# Patient Record
Sex: FEMALE | Race: WHITE | NOT HISPANIC OR LATINO | Employment: UNEMPLOYED | URBAN - METROPOLITAN AREA
[De-identification: names, ages, dates, MRNs, and addresses within clinical notes are randomized per-mention and may not be internally consistent; named-entity substitution may affect disease eponyms.]

---

## 2021-04-17 ENCOUNTER — HOSPITAL ENCOUNTER (EMERGENCY)
Facility: HOSPITAL | Age: 36
Discharge: HOME | End: 2021-04-17
Attending: EMERGENCY MEDICINE
Payer: MEDICAID

## 2021-04-17 VITALS
RESPIRATION RATE: 16 BRPM | DIASTOLIC BLOOD PRESSURE: 59 MMHG | BODY MASS INDEX: 24.55 KG/M2 | TEMPERATURE: 99 F | HEART RATE: 52 BPM | OXYGEN SATURATION: 99 % | HEIGHT: 61 IN | WEIGHT: 130.06 LBS | SYSTOLIC BLOOD PRESSURE: 112 MMHG

## 2021-04-17 DIAGNOSIS — M54.50 ACUTE BILATERAL LOW BACK PAIN WITHOUT SCIATICA: ICD-10-CM

## 2021-04-17 DIAGNOSIS — R30.0 DYSURIA: ICD-10-CM

## 2021-04-17 LAB
ANION GAP SERPL CALC-SCNC: 9 MMOL/L (ref 8–16)
B-HCG UR QL: NEGATIVE
BASOPHILS # BLD AUTO: 0.02 K/UL (ref 0–0.2)
BASOPHILS NFR BLD: 0.3 % (ref 0–1.9)
BILIRUB UR QL STRIP: NEGATIVE
CALCIUM SERPL-MCNC: 9.5 MG/DL (ref 8.7–10.5)
CHLORIDE SERPL-SCNC: 103 MMOL/L (ref 95–110)
CLARITY UR REFRACT.AUTO: CLEAR
CO2 SERPL-SCNC: 28 MMOL/L (ref 23–29)
COLOR UR AUTO: YELLOW
CREAT SERPL-MCNC: 0.72 MG/DL (ref 0.5–1.4)
DIFFERENTIAL METHOD: ABNORMAL
EOSINOPHIL # BLD AUTO: 0.2 K/UL (ref 0–0.5)
EOSINOPHIL NFR BLD: 2.2 % (ref 0–8)
ERYTHROCYTE [DISTWIDTH] IN BLOOD BY AUTOMATED COUNT: 12 % (ref 11.5–14.5)
EST. GFR  (AFRICAN AMERICAN): >60 ML/MIN/1.73 M^2
EST. GFR  (NON AFRICAN AMERICAN): >60 ML/MIN/1.73 M^2
GLUCOSE SERPL-MCNC: 88 MG/DL (ref 70–110)
GLUCOSE UR QL STRIP: NEGATIVE
HCT VFR BLD AUTO: 39.1 % (ref 37–48.5)
HGB BLD-MCNC: 13.4 G/DL (ref 12–16)
HGB UR QL STRIP: NEGATIVE
IMM GRANULOCYTES # BLD AUTO: 0.01 K/UL (ref 0–0.04)
IMM GRANULOCYTES NFR BLD AUTO: 0.1 % (ref 0–0.5)
KETONES UR QL STRIP: NEGATIVE
LEUKOCYTE ESTERASE UR QL STRIP: NEGATIVE
LYMPHOCYTES # BLD AUTO: 2.8 K/UL (ref 1–4.8)
LYMPHOCYTES NFR BLD: 40.6 % (ref 18–48)
MCH RBC QN AUTO: 31.3 PG (ref 27–31)
MCHC RBC AUTO-ENTMCNC: 34.3 G/DL (ref 32–36)
MCV RBC AUTO: 91 FL (ref 82–98)
MONOCYTES # BLD AUTO: 0.6 K/UL (ref 0.3–1)
MONOCYTES NFR BLD: 9.2 % (ref 4–15)
NEUTROPHILS # BLD AUTO: 3.3 K/UL (ref 1.8–7.7)
NEUTROPHILS NFR BLD: 47.6 % (ref 38–73)
NITRITE UR QL STRIP: NEGATIVE
NRBC BLD-RTO: 0 /100 WBC
PH UR STRIP: 5 [PH] (ref 5–8)
PLATELET # BLD AUTO: 314 K/UL (ref 150–450)
PMV BLD AUTO: 9 FL (ref 9.2–12.9)
POTASSIUM SERPL-SCNC: 4 MMOL/L (ref 3.5–5.1)
PROT UR QL STRIP: NEGATIVE
RBC # BLD AUTO: 4.28 M/UL (ref 4–5.4)
SODIUM SERPL-SCNC: 140 MMOL/L (ref 136–145)
SP GR UR STRIP: 1.02 (ref 1–1.03)
URN SPEC COLLECT METH UR: NORMAL
UROBILINOGEN UR STRIP-ACNC: NEGATIVE EU/DL
UUN UR-MCNC: 11 MG/DL (ref 7–17)
WBC # BLD AUTO: 6.88 K/UL (ref 3.9–12.7)

## 2021-04-17 PROCEDURE — 63600175 PHARM REV CODE 636 W HCPCS: Mod: ER | Performed by: EMERGENCY MEDICINE

## 2021-04-17 PROCEDURE — 81003 URINALYSIS AUTO W/O SCOPE: CPT | Mod: ER | Performed by: EMERGENCY MEDICINE

## 2021-04-17 PROCEDURE — 81025 URINE PREGNANCY TEST: CPT | Mod: ER | Performed by: EMERGENCY MEDICINE

## 2021-04-17 PROCEDURE — 96374 THER/PROPH/DIAG INJ IV PUSH: CPT | Mod: ER

## 2021-04-17 PROCEDURE — 85025 COMPLETE CBC W/AUTO DIFF WBC: CPT | Mod: ER | Performed by: EMERGENCY MEDICINE

## 2021-04-17 PROCEDURE — 99284 EMERGENCY DEPT VISIT MOD MDM: CPT | Mod: 25,ER

## 2021-04-17 PROCEDURE — 80048 BASIC METABOLIC PNL TOTAL CA: CPT | Mod: ER | Performed by: EMERGENCY MEDICINE

## 2021-04-17 RX ORDER — KETOROLAC TROMETHAMINE 10 MG/1
10 TABLET, FILM COATED ORAL EVERY 8 HOURS PRN
Qty: 12 TABLET | Refills: 0 | Status: SHIPPED | OUTPATIENT
Start: 2021-04-17 | End: 2021-08-09 | Stop reason: CLARIF

## 2021-04-17 RX ORDER — METHOCARBAMOL 500 MG/1
1000 TABLET, FILM COATED ORAL 4 TIMES DAILY PRN
Qty: 30 TABLET | Refills: 0 | Status: SHIPPED | OUTPATIENT
Start: 2021-04-17 | End: 2021-04-22

## 2021-04-17 RX ORDER — ONDANSETRON 4 MG/1
4 TABLET, ORALLY DISINTEGRATING ORAL EVERY 6 HOURS PRN
Qty: 10 TABLET | Refills: 0 | Status: SHIPPED | OUTPATIENT
Start: 2021-04-17 | End: 2021-08-09 | Stop reason: CLARIF

## 2021-04-17 RX ORDER — CLONAZEPAM 1 MG/1
1 TABLET ORAL 2 TIMES DAILY PRN
COMMUNITY

## 2021-04-17 RX ORDER — KETOROLAC TROMETHAMINE 30 MG/ML
15 INJECTION, SOLUTION INTRAMUSCULAR; INTRAVENOUS
Status: COMPLETED | OUTPATIENT
Start: 2021-04-17 | End: 2021-04-17

## 2021-04-17 RX ORDER — BUPRENORPHINE HYDROCHLORIDE AND NALOXONE HYDROCHLORIDE DIHYDRATE 2; .5 MG/1; MG/1
TABLET SUBLINGUAL EVERY 6 HOURS PRN
COMMUNITY

## 2021-04-17 RX ADMIN — KETOROLAC TROMETHAMINE 15 MG: 30 INJECTION, SOLUTION INTRAMUSCULAR at 08:04

## 2021-06-13 ENCOUNTER — HOSPITAL ENCOUNTER (EMERGENCY)
Facility: HOSPITAL | Age: 36
Discharge: HOME | End: 2021-06-13
Attending: EMERGENCY MEDICINE
Payer: MEDICAID

## 2021-06-13 VITALS
RESPIRATION RATE: 19 BRPM | HEART RATE: 52 BPM | TEMPERATURE: 99 F | DIASTOLIC BLOOD PRESSURE: 50 MMHG | SYSTOLIC BLOOD PRESSURE: 92 MMHG | HEIGHT: 61 IN | BODY MASS INDEX: 24.55 KG/M2 | OXYGEN SATURATION: 98 % | WEIGHT: 130 LBS

## 2021-06-13 DIAGNOSIS — N39.0 URINARY TRACT INFECTION WITHOUT HEMATURIA, SITE UNSPECIFIED: Primary | ICD-10-CM

## 2021-06-13 LAB
ALBUMIN SERPL BCP-MCNC: 4.1 G/DL (ref 3.5–5.2)
ALP SERPL-CCNC: 54 U/L (ref 38–126)
ALT SERPL W/O P-5'-P-CCNC: 12 U/L (ref 10–44)
ANION GAP SERPL CALC-SCNC: 5 MMOL/L (ref 8–16)
AST SERPL-CCNC: 19 U/L (ref 15–46)
B-HCG UR QL: NEGATIVE
BACTERIA #/AREA URNS AUTO: ABNORMAL /HPF
BASOPHILS # BLD AUTO: 0.03 K/UL (ref 0–0.2)
BASOPHILS NFR BLD: 0.4 % (ref 0–1.9)
BILIRUB SERPL-MCNC: 0.6 MG/DL (ref 0.1–1)
BILIRUB UR QL STRIP: NEGATIVE
CALCIUM SERPL-MCNC: 9.2 MG/DL (ref 8.7–10.5)
CHLORIDE SERPL-SCNC: 105 MMOL/L (ref 95–110)
CLARITY UR REFRACT.AUTO: CLEAR
CO2 SERPL-SCNC: 27 MMOL/L (ref 23–29)
COLOR UR AUTO: YELLOW
CREAT SERPL-MCNC: 0.62 MG/DL (ref 0.5–1.4)
DIFFERENTIAL METHOD: ABNORMAL
EOSINOPHIL # BLD AUTO: 0.1 K/UL (ref 0–0.5)
EOSINOPHIL NFR BLD: 1.5 % (ref 0–8)
ERYTHROCYTE [DISTWIDTH] IN BLOOD BY AUTOMATED COUNT: 12.4 % (ref 11.5–14.5)
EST. GFR  (AFRICAN AMERICAN): >60 ML/MIN/1.73 M^2
EST. GFR  (NON AFRICAN AMERICAN): >60 ML/MIN/1.73 M^2
GLUCOSE SERPL-MCNC: 91 MG/DL (ref 70–110)
GLUCOSE UR QL STRIP: NEGATIVE
HCT VFR BLD AUTO: 38.1 % (ref 37–48.5)
HGB BLD-MCNC: 12.8 G/DL (ref 12–16)
HGB UR QL STRIP: NEGATIVE
IMM GRANULOCYTES # BLD AUTO: 0.02 K/UL (ref 0–0.04)
IMM GRANULOCYTES NFR BLD AUTO: 0.3 % (ref 0–0.5)
KETONES UR QL STRIP: NEGATIVE
LEUKOCYTE ESTERASE UR QL STRIP: ABNORMAL
LYMPHOCYTES # BLD AUTO: 2.5 K/UL (ref 1–4.8)
LYMPHOCYTES NFR BLD: 32.8 % (ref 18–48)
MCH RBC QN AUTO: 31 PG (ref 27–31)
MCHC RBC AUTO-ENTMCNC: 33.6 G/DL (ref 32–36)
MCV RBC AUTO: 92 FL (ref 82–98)
MICROSCOPIC COMMENT: ABNORMAL
MONOCYTES # BLD AUTO: 0.7 K/UL (ref 0.3–1)
MONOCYTES NFR BLD: 8.8 % (ref 4–15)
NEUTROPHILS # BLD AUTO: 4.2 K/UL (ref 1.8–7.7)
NEUTROPHILS NFR BLD: 56.2 % (ref 38–73)
NITRITE UR QL STRIP: POSITIVE
NRBC BLD-RTO: 0 /100 WBC
PH UR STRIP: 5 [PH] (ref 5–8)
PLATELET # BLD AUTO: 283 K/UL (ref 150–450)
PMV BLD AUTO: 8.9 FL (ref 9.2–12.9)
POTASSIUM SERPL-SCNC: 4.2 MMOL/L (ref 3.5–5.1)
PROT SERPL-MCNC: 6.7 G/DL (ref 6–8.4)
PROT UR QL STRIP: NEGATIVE
RBC # BLD AUTO: 4.13 M/UL (ref 4–5.4)
RBC #/AREA URNS AUTO: 2 /HPF (ref 0–4)
SODIUM SERPL-SCNC: 137 MMOL/L (ref 136–145)
SP GR UR STRIP: 1.02 (ref 1–1.03)
URN SPEC COLLECT METH UR: ABNORMAL
UROBILINOGEN UR STRIP-ACNC: NEGATIVE EU/DL
UUN UR-MCNC: 11 MG/DL (ref 7–17)
WBC # BLD AUTO: 7.5 K/UL (ref 3.9–12.7)
WBC #/AREA URNS AUTO: 7 /HPF (ref 0–5)

## 2021-06-13 PROCEDURE — 81025 URINE PREGNANCY TEST: CPT | Mod: ER | Performed by: PHYSICIAN ASSISTANT

## 2021-06-13 PROCEDURE — 80053 COMPREHEN METABOLIC PANEL: CPT | Mod: ER | Performed by: EMERGENCY MEDICINE

## 2021-06-13 PROCEDURE — 99284 EMERGENCY DEPT VISIT MOD MDM: CPT | Mod: ER

## 2021-06-13 PROCEDURE — 85025 COMPLETE CBC W/AUTO DIFF WBC: CPT | Mod: ER | Performed by: EMERGENCY MEDICINE

## 2021-06-13 PROCEDURE — 81000 URINALYSIS NONAUTO W/SCOPE: CPT | Mod: ER | Performed by: PHYSICIAN ASSISTANT

## 2021-06-13 RX ORDER — PHENAZOPYRIDINE HYDROCHLORIDE 200 MG/1
200 TABLET, FILM COATED ORAL 2 TIMES DAILY
Qty: 4 TABLET | Refills: 0 | Status: SHIPPED | OUTPATIENT
Start: 2021-06-13 | End: 2021-06-15

## 2021-06-13 RX ORDER — CIPROFLOXACIN 500 MG/1
500 TABLET ORAL 2 TIMES DAILY
Qty: 20 TABLET | Refills: 0 | Status: SHIPPED | OUTPATIENT
Start: 2021-06-13 | End: 2021-06-18

## 2021-06-13 RX ORDER — ORPHENADRINE CITRATE 100 MG/1
100 TABLET, EXTENDED RELEASE ORAL 2 TIMES DAILY PRN
Qty: 20 TABLET | Refills: 0 | Status: SHIPPED | OUTPATIENT
Start: 2021-06-13 | End: 2021-08-09 | Stop reason: CLARIF

## 2021-07-18 ENCOUNTER — HOSPITAL ENCOUNTER (EMERGENCY)
Facility: HOSPITAL | Age: 36
Discharge: HOME | End: 2021-07-18
Attending: FAMILY MEDICINE
Payer: MEDICAID

## 2021-07-18 VITALS
BODY MASS INDEX: 20.39 KG/M2 | OXYGEN SATURATION: 98 % | SYSTOLIC BLOOD PRESSURE: 138 MMHG | HEART RATE: 63 BPM | TEMPERATURE: 99 F | RESPIRATION RATE: 19 BRPM | HEIGHT: 61 IN | WEIGHT: 108 LBS | DIASTOLIC BLOOD PRESSURE: 74 MMHG

## 2021-07-18 DIAGNOSIS — J06.9 VIRAL URI WITH COUGH: Primary | ICD-10-CM

## 2021-07-18 LAB
INFLUENZA A, MOLECULAR: NEGATIVE
INFLUENZA B, MOLECULAR: NEGATIVE
SARS-COV-2 RDRP RESP QL NAA+PROBE: NEGATIVE
SPECIMEN SOURCE: NORMAL

## 2021-07-18 PROCEDURE — U0002 COVID-19 LAB TEST NON-CDC: HCPCS | Mod: ER | Performed by: FAMILY MEDICINE

## 2021-07-18 PROCEDURE — 87502 INFLUENZA DNA AMP PROBE: CPT | Mod: ER | Performed by: FAMILY MEDICINE

## 2021-07-18 PROCEDURE — 99283 EMERGENCY DEPT VISIT LOW MDM: CPT | Mod: 25,ER

## 2021-07-18 RX ORDER — BENZONATATE 100 MG/1
100 CAPSULE ORAL 3 TIMES DAILY PRN
Qty: 20 CAPSULE | Refills: 0 | Status: SHIPPED | OUTPATIENT
Start: 2021-07-18 | End: 2021-07-28

## 2021-07-21 ENCOUNTER — TELEPHONE (OUTPATIENT)
Dept: ADMINISTRATIVE | Facility: OTHER | Age: 36
End: 2021-07-21

## 2021-08-09 ENCOUNTER — HOSPITAL ENCOUNTER (EMERGENCY)
Facility: HOSPITAL | Age: 36
Discharge: HOME | End: 2021-08-09
Attending: EMERGENCY MEDICINE
Payer: MEDICAID

## 2021-08-09 VITALS
TEMPERATURE: 99 F | RESPIRATION RATE: 15 BRPM | BODY MASS INDEX: 23.62 KG/M2 | OXYGEN SATURATION: 98 % | DIASTOLIC BLOOD PRESSURE: 79 MMHG | SYSTOLIC BLOOD PRESSURE: 120 MMHG | WEIGHT: 125 LBS | HEART RATE: 84 BPM

## 2021-08-09 DIAGNOSIS — S60.221A CONTUSION OF RIGHT HAND, INITIAL ENCOUNTER: Primary | ICD-10-CM

## 2021-08-09 LAB — B-HCG UR QL: NEGATIVE

## 2021-08-09 PROCEDURE — 25000003 PHARM REV CODE 250: Mod: ER | Performed by: PHYSICIAN ASSISTANT

## 2021-08-09 PROCEDURE — 81025 URINE PREGNANCY TEST: CPT | Mod: ER | Performed by: PHYSICIAN ASSISTANT

## 2021-08-09 PROCEDURE — 99284 EMERGENCY DEPT VISIT MOD MDM: CPT | Mod: ER

## 2021-08-09 RX ORDER — ACETAMINOPHEN 325 MG/1
650 TABLET ORAL
Status: COMPLETED | OUTPATIENT
Start: 2021-08-09 | End: 2021-08-09

## 2021-08-09 RX ADMIN — ACETAMINOPHEN 650 MG: 325 TABLET ORAL at 06:08

## 2023-07-12 ENCOUNTER — HOSPITAL ENCOUNTER (EMERGENCY)
Facility: HOSPITAL | Age: 38
Discharge: HOME | End: 2023-07-13
Attending: EMERGENCY MEDICINE
Payer: MEDICAID

## 2023-07-12 VITALS
SYSTOLIC BLOOD PRESSURE: 153 MMHG | RESPIRATION RATE: 19 BRPM | TEMPERATURE: 99 F | DIASTOLIC BLOOD PRESSURE: 100 MMHG | BODY MASS INDEX: 22.66 KG/M2 | HEIGHT: 61 IN | OXYGEN SATURATION: 97 % | WEIGHT: 120 LBS | HEART RATE: 87 BPM

## 2023-07-12 DIAGNOSIS — R20.2 PARESTHESIAS: Primary | ICD-10-CM

## 2023-07-12 PROCEDURE — 99282 EMERGENCY DEPT VISIT SF MDM: CPT | Mod: ER

## 2023-07-12 RX ORDER — NITROFURANTOIN 25; 75 MG/1; MG/1
100 CAPSULE ORAL
COMMUNITY

## 2023-07-12 RX ORDER — KETOROLAC TROMETHAMINE 15 MG/ML
30 INJECTION, SOLUTION INTRAMUSCULAR; INTRAVENOUS EVERY 6 HOURS
COMMUNITY

## 2023-07-13 NOTE — ED TRIAGE NOTES
Pt presents to ED c c/o tingling to left leg. Pt reports starting two new medications (Macrobid and Toradol) Pt states tingling sensation began after eating [employer] and wants to make sure she doesn't have food poisoning. Denies any nausea, vomiting, or diarrhea.

## 2023-07-14 NOTE — ED PROVIDER NOTES
ED Provider Note - 2023    History     Chief Complaint   Patient presents with    Tingling     C/o tingling to left hand and leg started about an hour pta. Started abx yesterday. Stiffness in neck. No dizziness or blurry vision. No slurred speech. Equal hand grasps no pronator drift.      Patient currently presents with concern regarding left forearm tingling and tenderness.  Patient notes associated stiffness in the left side of her neck.  Patient does endorse recent activity with pressure washing her home.  Patient has not had associated weakness nor difficulty with coordination.  She does report that this evening she has a small area of tingling to the medial aspect of her distal thigh but this appears to be localized to that region alone.  Patient remains easily ambulatory with steady gait.  Patient has not had associated headache nor visual changes nor does she endorse difficulty speaking.    Review of patient's allergies indicates:   Allergen Reactions    Penicillins      Past Medical History:   Diagnosis Date    Bipolar 1 disorder     Thyroid disease      Past Surgical History:   Procedure Laterality Date     SECTION      DILATION AND CURETTAGE OF UTERUS       No family history on file.  Social History     Tobacco Use    Smoking status: Some Days     Types: Cigarettes, Vaping with nicotine   Substance Use Topics    Alcohol use: Never    Drug use: Yes     Types: Marijuana     Comment: last use today     Review of Systems   Constitutional:  Negative for chills and fever.   HENT:  Negative for congestion and rhinorrhea.    Respiratory:  Negative for cough and shortness of breath.    Cardiovascular:  Negative for chest pain and palpitations.   Gastrointestinal:  Negative for abdominal pain, diarrhea and vomiting.   Genitourinary:  Negative for difficulty urinating and dysuria.   Skin:  Negative for color change and rash.   Neurological:  Negative for dizziness and light-headedness.   Hematological:   Negative for adenopathy. Does not bruise/bleed easily.     Physical Exam     Initial Vitals [07/12/23 2251]   BP Pulse Resp Temp SpO2   (!) 153/100 87 19 98.5 °F (36.9 °C) 97 %      MAP       --           Physical Exam    Nursing note and vitals reviewed.  Constitutional: She appears well-developed and well-nourished. She is not diaphoretic. No distress.   HENT:   Head: Normocephalic and atraumatic.   Nose: Nose normal.   Mouth/Throat: Oropharynx is clear and moist.   Eyes: Conjunctivae and EOM are normal. Pupils are equal, round, and reactive to light. No scleral icterus.   Neck: Neck supple. No JVD present.   Cardiovascular:  Normal rate, regular rhythm, normal heart sounds and intact distal pulses.           Pulmonary/Chest: Breath sounds normal. No respiratory distress.   Abdominal: Abdomen is soft. There is no abdominal tenderness.   Musculoskeletal:         General: No edema. Normal range of motion.      Cervical back: Neck supple.     Neurological: She is alert and oriented to person, place, and time. She has normal strength. No cranial nerve deficit or sensory deficit. Coordination and gait normal. GCS score is 15. GCS eye subscore is 4. GCS verbal subscore is 5. GCS motor subscore is 6.   Skin: Skin is warm and dry.   Psychiatric: Her behavior is normal. Thought content normal. Her mood appears anxious. She is attentive.       ED Course   Procedures                   MDM  Differential Diagnoses         LABS   Labs Reviewed - No data to display      Imaging     Imaging Results    None            EKG        ED Management/Discussion   Medications - No data to display                The patient's list of active medical problems, social history, medications, and allergies as documented per RN staff has been reviewed.                 Left upper extremity symptoms are not present during the encounter.  Left lower extremity paresthesia appears to be well confined to a small region on the left medial thigh.  Patient  has no additional neurological symptoms at this time.  I suspect that her upper extremity symptoms including the tightness in her neck may have been from recent activities as reported above.  The lower extremity symptoms remains somewhat vague though I see no signs of any vascular compromise or infection.    On final assessment, the patient appears well and comfortable for discharge.  I see no indication of an emergent process beyond that addressed during our encounter but have duly counseled the patient/family regarding the need for prompt follow-up as well as the indications that should prompt immediate return to the emergency room should new or worrisome developments occur.  The patient/family has been provided with verbal and printed direction regarding our final diagnosis(es) as well as instructions regarding use of OTC and/or Rx medications intended to manage the patient's aforementioned conditions including:  ED Prescriptions    None           Patient has been advised of the following recommended follow-up instructions:  Follow-up Information       Follow up With Specialties Details Why Contact Info    PCP  Schedule an appointment as soon as possible for a visit  for reassessment     [facility] Emergency Dept Emergency Medicine Go to  As needed, If symptoms worsen [address] [facility] [address] [phone]          The patient/family communicates understanding of all this information and all remaining questions and concerns were addressed at this time.      Referrals:  No orders of the defined types were placed in this encounter.      CLINICAL IMPRESSION    ICD-10-CM ICD-9-CM   1. Paresthesias  R20.2 782.0          ED Disposition Condition    Discharge Stable               Hai Goetz MD  07/13/23 3145

## 2024-05-31 ENCOUNTER — HOSPITAL ENCOUNTER (EMERGENCY)
Facility: HOSPITAL | Age: 39
Discharge: HOME | End: 2024-05-31
Attending: FAMILY MEDICINE
Payer: MEDICAID

## 2024-05-31 VITALS
SYSTOLIC BLOOD PRESSURE: 177 MMHG | WEIGHT: 127 LBS | TEMPERATURE: 98 F | BODY MASS INDEX: 23.98 KG/M2 | DIASTOLIC BLOOD PRESSURE: 113 MMHG | RESPIRATION RATE: 20 BRPM | OXYGEN SATURATION: 97 % | HEART RATE: 89 BPM | HEIGHT: 61 IN

## 2024-05-31 DIAGNOSIS — S61.212A LACERATION OF RIGHT MIDDLE FINGER WITHOUT FOREIGN BODY WITHOUT DAMAGE TO NAIL, INITIAL ENCOUNTER: Primary | ICD-10-CM

## 2024-05-31 DIAGNOSIS — Z76.0 ENCOUNTER FOR MEDICATION REFILL: ICD-10-CM

## 2024-05-31 LAB
B-HCG UR QL: NEGATIVE
CTP QC/QA: YES

## 2024-05-31 PROCEDURE — 63600175 PHARM REV CODE 636 W HCPCS: Mod: ER

## 2024-05-31 PROCEDURE — 12001 RPR S/N/AX/GEN/TRNK 2.5CM/<: CPT | Mod: ER

## 2024-05-31 PROCEDURE — 90471 IMMUNIZATION ADMIN: CPT | Mod: ER

## 2024-05-31 PROCEDURE — 90715 TDAP VACCINE 7 YRS/> IM: CPT | Mod: ER

## 2024-05-31 PROCEDURE — 81025 URINE PREGNANCY TEST: CPT | Mod: ER

## 2024-05-31 PROCEDURE — 99284 EMERGENCY DEPT VISIT MOD MDM: CPT | Mod: 25,ER

## 2024-05-31 RX ORDER — CEPHALEXIN 500 MG/1
500 CAPSULE ORAL 4 TIMES DAILY
Qty: 20 CAPSULE | Refills: 0 | Status: SHIPPED | OUTPATIENT
Start: 2024-05-31 | End: 2024-05-31 | Stop reason: ALTCHOICE

## 2024-05-31 RX ORDER — DROSPIRENONE AND ETHINYL ESTRADIOL 0.02-3(28)
1 KIT ORAL DAILY
Qty: 90 TABLET | Refills: 3 | Status: SHIPPED | OUTPATIENT
Start: 2024-05-31 | End: 2025-05-31

## 2024-05-31 RX ORDER — BUPIVACAINE HYDROCHLORIDE 5 MG/ML
10 INJECTION, SOLUTION EPIDURAL; INTRACAUDAL
Status: DISCONTINUED | OUTPATIENT
Start: 2024-05-31 | End: 2024-05-31

## 2024-05-31 RX ADMIN — TETANUS TOXOID, REDUCED DIPHTHERIA TOXOID AND ACELLULAR PERTUSSIS VACCINE, ADSORBED 0.5 ML: 5; 2.5; 8; 8; 2.5 SUSPENSION INTRAMUSCULAR at 02:05

## 2024-05-31 NOTE — ED PROVIDER NOTES
Encounter Date: 2024       History     Chief Complaint   Patient presents with    Laceration     Lac to distal 3rd digit of right hand.      Patient is a 38 y.o. female who presents to the ED for evaluation of a laceration to the distal aspect of the right third finger. States that she was cleaning the bottom of a toilet and cut her finger on a broken piece of porcelain.  This occurred just PTA. There were no other injuries. Bleeding is controlled.  Denies any other complaints at this time. Tetanus vaccination status reviewed: Td vaccination indicated and given today. Patient also requests refill of birth control pills. States that she is unable to get in with her OBGYN or PCP.     The history is provided by the patient.     Review of patient's allergies indicates:   Allergen Reactions    Penicillins      Past Medical History:   Diagnosis Date    Bipolar 1 disorder     Thyroid disease      Past Surgical History:   Procedure Laterality Date     SECTION      DILATION AND CURETTAGE OF UTERUS       No family history on file.  Social History     Tobacco Use    Smoking status: Some Days     Types: Cigarettes, Vaping with nicotine   Substance Use Topics    Alcohol use: Never    Drug use: Yes     Types: Marijuana     Comment: last use today     Review of Systems   Constitutional:  Negative for fever.   Musculoskeletal:  Negative for back pain.   Skin:  Positive for wound (laceration right 3rd finger). Negative for color change, pallor and rash.       Physical Exam     Initial Vitals [24 1401]   BP Pulse Resp Temp SpO2   (!) 177/113 89 20 98.2 °F (36.8 °C) 97 %      MAP       --         Physical Exam    Constitutional: She appears well-developed and well-nourished.   HENT:   Head: Normocephalic and atraumatic.   Musculoskeletal:        Hands:      Neurological: She is alert.         ED Course   Lac Repair    Date/Time: 2024 2:24 PM    Performed by: Yue Cleaning PA-C  Authorized by: Yue Cleaning PA-C     Consent:     Consent obtained:  Verbal    Consent given by:  Patient    Risks, benefits, and alternatives were discussed: yes      Risks discussed:  Infection, need for additional repair, nerve damage, poor cosmetic result, pain and poor wound healing    Alternatives discussed:  No treatment  Universal protocol:     Procedure explained and questions answered to patient or proxy's satisfaction: yes      Patient identity confirmed:  Verbally with patient and arm band  Anesthesia:     Anesthesia method:  None  Laceration details:     Location:  Finger    Finger location:  R long finger    Length (cm):  1    Depth (mm):  1  Pre-procedure details:     Preparation:  Patient was prepped and draped in usual sterile fashion  Exploration:     Hemostasis achieved with:  Direct pressure    Imaging outcome: foreign body not noted      Wound exploration: wound explored through full range of motion and entire depth of wound visualized    Treatment:     Area cleansed with:  Shnain-Clens    Amount of cleaning:  Standard    Irrigation solution:  Sterile saline  Skin repair:     Repair method:  Tissue adhesive  Approximation:     Approximation:  Close  Repair type:     Repair type:  Simple  Post-procedure details:     Dressing:  Non-adherent dressing    Procedure completion:  Tolerated well, no immediate complications    Labs Reviewed   POCT URINE PREGNANCY          Imaging Results              X-Ray Finger 2 or More Views Right (Final result)  Result time 05/31/24 14:48:05      Final result by Floyd Lyles MD (Timothy) (05/31/24 14:48:05)                   Impression:      As above      Electronically signed by: Floyd Lyles MD  Date:    05/31/2024  Time:    14:48               Narrative:    EXAMINATION:  XR FINGER 2 OR MORE VIEWS RIGHT    CLINICAL HISTORY:  laceration; right finger pain and injury    COMPARISON:  None    FINDINGS:  Bone density and architecture are normal. No acute findings. No fracture.  No radiopaque  foreign body                                       Medications   Tdap (BOOSTRIX) vaccine injection 0.5 mL (0.5 mLs Intramuscular Given 5/31/24 0466)     Medical Decision Making  Patient is a well appearing 38 y.o. female who presents for evaluation of a laceration to the right third finger. Neurovascular and tendon structures are intact. Laceration is approximately 1cm. Bleeding is controlled. No nailbed involvement.  The patient remained comfortable and stable during their visit in the ED. Details of ED course documented in ED workup.     Differential Diagnosis includes, but is not limited to: Open fracture, vascular injury, tendon/ligament injury, nerve injury, retained foreign body, superficial laceration, abrasion.     After complete evaluation, including thorough history and physical exam, the patient's injury and laceration was deemed to be appropriate for primary closure in the ED. Xray with no acute bony abnormalities or foreign bodies. The wound was irrigated extensively and inspected for foreign body, and none were found. The laceration was repaired using Dermabond. Patient tolerated procedure well.  Due to the nature of the wound, antibiotics were not deemed necessary. Patient also requests refill of birth control pills. States that she is unable to get in with her OBGYN or PCP. Will send in 3 months supply and have patient follow up with PCP/OBGYN.     There are no concerning features on physical exam to suggest an emergent or life threatening condition. No further intervention is indicated at this time. The patient is at low risk for an emergent/life threatening medical condition at this time, and I am of the belief that that it is safe to discharge the patient from the emergency department.     The patient was given wound care instructions, including keeping wound clean/dry, use of sterile bandages, and avoiding submersion in standing water. The patient was instructed to regularly monitor the wound for  any evidence of infection, including redness, fever, or drainage and return to the ER with any concerns.  Additionally, patient instructed to follow up with PCP in 2-3 days for recheck of wound.    Patient stated understanding and comfortable with plan of care. Discharge and follow-up instructions discussed with the patient who expressed understanding and willingness to comply with recommendations. Patient discharged from the emergency department in stable condition, in no acute distress.     Amount and/or Complexity of Data Reviewed  Labs: ordered.  Radiology: ordered.    Risk  Prescription drug management.               ED Course as of 05/31/24 1639   Fri May 31, 2024   1416 Patient examined and assessed. Patient answering questions appropriately, speaking in complete sentences, respirations are even and unlabored.  AAO x 4.  [OB]   1450 Finger independently reviewed: No acute bony abnormalities.  No foreign bodies. [OB]      ED Course User Index  [OB] Yue Cleaning PA-C                           Clinical Impression:  Final diagnoses:  [S61.212A] Laceration of right middle finger without foreign body without damage to nail, initial encounter (Primary)  [Z76.0] Encounter for medication refill          ED Disposition Condition    Discharge Stable          ED Prescriptions       Medication Sig Dispense Start Date End Date Auth. Provider    cephALEXin (KEFLEX) 500 MG capsule  (Status: Discontinued) Take 1 capsule (500 mg total) by mouth 4 (four) times daily. for 5 days 20 capsule 5/31/2024 5/31/2024 Yue Cleaning PA-C    drospirenone-ethinyl estradioL (SYDNEY) 3-0.02 mg per tablet Take 1 tablet by mouth once daily. 90 tablet 5/31/2024 5/31/2025 Yue Cleaning PA-C          Follow-up Information       Follow up With Specialties Details Why Contact Info Additional Information    [facility]   [address], [address]  [facility] [address] [phone] Please park in [address] C or  D and use [address]. [facility] Office Bldg. elevators.             Yue Cleaning PA-C  05/31/24 7357

## 2024-05-31 NOTE — DISCHARGE INSTRUCTIONS
Thank you for letting me care for you today - it was nice to meet you and I hope you feel better soon. Please return to the ER if your symptoms don't improve or get worse. And be sure to follow up with your OBGYN for follow up care. [facility] will call you within 48 hours to make an appointment, or you can call [phone] ([phone]) to schedule.     I have placed a referral to [facility] for Primary Care. You can call [phone] to schedule.     I have sent in a prescription for Keflex. Please take 4 times a day for 5 days.     Our goal at [facility] is to always give you outstanding care and exceptional service. You may receive a survey by mail or email in the next week about your experience in our ED. We would greatly appreciate you completing and returning the survey. Your feedback provides us with a way to recognize our staff who give very good care and it helps us learn how to improve when your experience was below our aspiration of excellence.     All the best,     Yue Cleaning, MPH, PA-C  Emergency Department Physician Assistant  [facility]